# Patient Record
Sex: MALE | Race: WHITE | NOT HISPANIC OR LATINO | Employment: FULL TIME | URBAN - METROPOLITAN AREA
[De-identification: names, ages, dates, MRNs, and addresses within clinical notes are randomized per-mention and may not be internally consistent; named-entity substitution may affect disease eponyms.]

---

## 2022-10-15 ENCOUNTER — HOSPITAL ENCOUNTER (EMERGENCY)
Facility: CLINIC | Age: 52
Discharge: HOME | End: 2022-10-15
Attending: EMERGENCY MEDICINE | Admitting: EMERGENCY MEDICINE
Payer: COMMERCIAL

## 2022-10-15 VITALS
WEIGHT: 215 LBS | DIASTOLIC BLOOD PRESSURE: 107 MMHG | HEART RATE: 86 BPM | HEIGHT: 71 IN | RESPIRATION RATE: 20 BRPM | BODY MASS INDEX: 30.1 KG/M2 | OXYGEN SATURATION: 98 % | SYSTOLIC BLOOD PRESSURE: 151 MMHG | TEMPERATURE: 98.5 F

## 2022-10-15 DIAGNOSIS — F10.920 ALCOHOLIC INTOXICATION WITHOUT COMPLICATION (H): ICD-10-CM

## 2022-10-15 LAB
ALBUMIN SERPL-MCNC: 4.1 G/DL (ref 3.4–5)
ALP SERPL-CCNC: 82 U/L (ref 40–150)
ALT SERPL W P-5'-P-CCNC: 33 U/L (ref 0–70)
ANION GAP SERPL CALCULATED.3IONS-SCNC: 9 MMOL/L (ref 3–14)
AST SERPL W P-5'-P-CCNC: 23 U/L (ref 0–45)
ATRIAL RATE - MUSE: 103 BPM
BASOPHILS # BLD AUTO: 0 10E3/UL (ref 0–0.2)
BASOPHILS NFR BLD AUTO: 1 %
BILIRUB SERPL-MCNC: 0.3 MG/DL (ref 0.2–1.3)
BUN SERPL-MCNC: 13 MG/DL (ref 7–30)
CALCIUM SERPL-MCNC: 9.1 MG/DL (ref 8.5–10.1)
CHLORIDE BLD-SCNC: 109 MMOL/L (ref 94–109)
CO2 SERPL-SCNC: 24 MMOL/L (ref 20–32)
CREAT SERPL-MCNC: 0.88 MG/DL (ref 0.66–1.25)
DIASTOLIC BLOOD PRESSURE - MUSE: NORMAL MMHG
EOSINOPHIL # BLD AUTO: 0.1 10E3/UL (ref 0–0.7)
EOSINOPHIL NFR BLD AUTO: 1 %
ERYTHROCYTE [DISTWIDTH] IN BLOOD BY AUTOMATED COUNT: 13.3 % (ref 10–15)
ETHANOL SERPL-MCNC: 0.25 G/DL
GFR SERPL CREATININE-BSD FRML MDRD: >90 ML/MIN/1.73M2
GLUCOSE BLD-MCNC: 103 MG/DL (ref 70–99)
HCT VFR BLD AUTO: 50.5 % (ref 40–53)
HGB BLD-MCNC: 16.8 G/DL (ref 13.3–17.7)
IMM GRANULOCYTES # BLD: 0 10E3/UL
IMM GRANULOCYTES NFR BLD: 0 %
INTERPRETATION ECG - MUSE: NORMAL
LYMPHOCYTES # BLD AUTO: 3.5 10E3/UL (ref 0.8–5.3)
LYMPHOCYTES NFR BLD AUTO: 52 %
MCH RBC QN AUTO: 29.1 PG (ref 26.5–33)
MCHC RBC AUTO-ENTMCNC: 33.3 G/DL (ref 31.5–36.5)
MCV RBC AUTO: 88 FL (ref 78–100)
MONOCYTES # BLD AUTO: 0.4 10E3/UL (ref 0–1.3)
MONOCYTES NFR BLD AUTO: 6 %
NEUTROPHILS # BLD AUTO: 2.7 10E3/UL (ref 1.6–8.3)
NEUTROPHILS NFR BLD AUTO: 40 %
NRBC # BLD AUTO: 0 10E3/UL
NRBC BLD AUTO-RTO: 0 /100
P AXIS - MUSE: 47 DEGREES
PLATELET # BLD AUTO: 306 10E3/UL (ref 150–450)
POTASSIUM BLD-SCNC: 3.8 MMOL/L (ref 3.4–5.3)
PR INTERVAL - MUSE: 186 MS
PROT SERPL-MCNC: 7.8 G/DL (ref 6.8–8.8)
QRS DURATION - MUSE: 84 MS
QT - MUSE: 334 MS
QTC - MUSE: 437 MS
R AXIS - MUSE: 28 DEGREES
RBC # BLD AUTO: 5.77 10E6/UL (ref 4.4–5.9)
SODIUM SERPL-SCNC: 142 MMOL/L (ref 133–144)
SYSTOLIC BLOOD PRESSURE - MUSE: NORMAL MMHG
T AXIS - MUSE: 34 DEGREES
VENTRICULAR RATE- MUSE: 103 BPM
WBC # BLD AUTO: 6.7 10E3/UL (ref 4–11)

## 2022-10-15 PROCEDURE — 85014 HEMATOCRIT: CPT | Performed by: EMERGENCY MEDICINE

## 2022-10-15 PROCEDURE — 96365 THER/PROPH/DIAG IV INF INIT: CPT

## 2022-10-15 PROCEDURE — 80053 COMPREHEN METABOLIC PANEL: CPT | Performed by: EMERGENCY MEDICINE

## 2022-10-15 PROCEDURE — 250N000011 HC RX IP 250 OP 636: Performed by: EMERGENCY MEDICINE

## 2022-10-15 PROCEDURE — 93005 ELECTROCARDIOGRAM TRACING: CPT

## 2022-10-15 PROCEDURE — 36415 COLL VENOUS BLD VENIPUNCTURE: CPT | Performed by: EMERGENCY MEDICINE

## 2022-10-15 PROCEDURE — 82077 ASSAY SPEC XCP UR&BREATH IA: CPT | Performed by: EMERGENCY MEDICINE

## 2022-10-15 PROCEDURE — 258N000003 HC RX IP 258 OP 636: Performed by: EMERGENCY MEDICINE

## 2022-10-15 PROCEDURE — 99284 EMERGENCY DEPT VISIT MOD MDM: CPT | Mod: 25

## 2022-10-15 PROCEDURE — 250N000009 HC RX 250: Performed by: EMERGENCY MEDICINE

## 2022-10-15 PROCEDURE — 96361 HYDRATE IV INFUSION ADD-ON: CPT

## 2022-10-15 RX ADMIN — SODIUM CHLORIDE 1000 ML: 9 INJECTION, SOLUTION INTRAVENOUS at 15:55

## 2022-10-15 RX ADMIN — FOLIC ACID 1000 ML/HR: 5 INJECTION, SOLUTION INTRAMUSCULAR; INTRAVENOUS; SUBCUTANEOUS at 17:18

## 2022-10-15 ASSESSMENT — ACTIVITIES OF DAILY LIVING (ADL)
ADLS_ACUITY_SCORE: 35
ADLS_ACUITY_SCORE: 35

## 2022-10-15 NOTE — ED NOTES
Bed: ED19  Expected date:   Expected time:   Means of arrival:   Comments:  [facility] [place] - [facility] - 51 M ETOH eta [id number]

## 2022-10-15 NOTE — ED NOTES
Pt pulled IV  and  dressed self and tried to leave. Unsteady on feet. Security called when they left he started smoking in his room. Security called back. Second IV initiated and agreeable to bolus and to stay in bed. Security searched his pockets.

## 2022-10-15 NOTE — ED TRIAGE NOTES
Pt on a JULIAN. From home. ++ ETOH and benzo withdrawal. Was found by police doing a welfare check. Found snoring, lives alone. When they tried to walk him he fell. Hypertensive and tachy.

## 2022-10-15 NOTE — ED PROVIDER NOTES
History     Chief Complaint:  Withdrawal       HPI   Edison Burton is a 51 year old male with a history of alcoholism who presents to the emergency department with alcohol intoxication.  The patient states that he has been drinking for the last 10 years.  He states he was last in treatment 5 or 4 years ago and was sober for many years.  He states he normally drinks 2 drinks a day.  He states that he last drink about 130 this morning.  He does take Ativan and Wellbutrin for anxiety.  The report by medics is that his ex-wife was concerned about him so called police for a welfare check.  When police got there he was sleeping on a couch.  He was difficult to arouse and he tried to get him to walk but he was unable to walk on his own.  They then placed on a health officer hold and transported him to the emergency room.  His blood sugar was 105 on the scene.  There is no apparent injury.  The patient denies any recreational drug use.  Patient denies any fever, cough, sore throat.  He denies any thoughts of self-harm or depression.  He states he works during the day and has been has been able to do this without difficulty.  He declines any resources for cam Deppe.  He would like to go home when he is able.  He is declining detox..    ROS:  Review of Systems  As per the HPI the remaining systems are negative    Allergies:  No Known Allergies     Medications:    ADVIL 200 MG OR TABS  ROBITUSSIN A-C  MG/5ML OR SYRP  TAMIFLU 75 MG OR CAPS    ativan  Wellbutrin    Past Medical History:    Alcohol use  Anxiety    Past Surgical History:    No past surgical history on file.     Family History:    family history is not on file.    Social History:   reports that he has been smoking cigarettes. He has a 10.00 pack-year smoking history. He does not have any smokeless tobacco history on file.  PCP: No primary care provider on file.     Physical Exam     Patient Vitals for the past 24 hrs:   BP Temp Temp src Pulse Resp SpO2  "Height Weight   10/15/22 1640 -- -- -- -- -- 98 % -- --   10/15/22 1630 (!) 150/117 -- -- -- -- -- -- --   10/15/22 1600 -- -- -- 101 20 -- -- --   10/15/22 1550 (!) 161/103 -- -- -- -- 97 % -- --   10/15/22 1547 (!) 161/103 -- -- 113 18 97 % 1.803 m (5' 11\") 97.5 kg (215 lb)   10/15/22 1532 (!) 145/100 98.5  F (36.9  C) Temporal 70 16 100 % 1.803 m (5' 11\") 97.5 kg (215 lb)        Physical Exam    Physical Exam   Constitutional:  Patient is oriented to person, place, and time. They appear well-developed and well-nourished. Mild distress secondary to intoxication.  Appears to be grossly intoxicated but was found to be wandering around the emergency room.  HENT:   Mouth/Throat:   Oropharynx is clear and moist.   Eyes:    Conjunctivae normal and EOM are normal. Pupils are equal, round, and reactive to light.   Neck:    Normal range of motion.   Cardiovascular: Mildly tachycardic regular rhythm and normal heart sounds.  Exam reveals no gallop and no friction rub.  No murmur heard.  Pulmonary/Chest:  Effort normal and breath sounds normal. Patient has no wheezes. Patient has no rales.   Abdominal:   Soft. Bowel sounds are normal. Patient exhibits no mass. There is no tenderness. There is no rebound and no guarding.   Musculoskeletal:  Normal range of motion. Patient exhibits no edema.   Neurological:   Patient is alert and oriented to person, place, and time. Patient has normal strength. No cranial nerve deficit or sensory deficit. GCS 15  Skin:   Skin is warm and dry. No rash noted. No erythema. No signs of injury  Psychiatric:   Patient has a normal mood .  Patient denies feeling suicidal      Emergency Department Course   ECG:  ECG results from 10/15/22   EKG 12-lead, tracing only     Value    Systolic Blood Pressure     Diastolic Blood Pressure     Ventricular Rate 103    Atrial Rate 103    PA Interval 186    QRS Duration 84        QTc 437    P Axis 47    R AXIS 28    T Axis 34    Interpretation ECG      Sinus " tachycardia  Otherwise normal ECG  No previous ECGs available  Confirmed by GENERATED REPORT, COMPUTER ([id number]),  Nahid Leblanc ([id number]) on 10/15/2022 4:06:57 PM         Laboratory:  Labs Ordered and Resulted from Time of ED Arrival to Time of ED Departure   COMPREHENSIVE METABOLIC PANEL - Abnormal       Result Value    Sodium 142      Potassium 3.8      Chloride 109      Carbon Dioxide (CO2) 24      Anion Gap 9      Urea Nitrogen 13      Creatinine 0.88      Calcium 9.1      Glucose 103 (*)     Alkaline Phosphatase 82      AST 23      ALT 33      Protein Total 7.8      Albumin 4.1      Bilirubin Total 0.3      GFR Estimate >90     ETHYL ALCOHOL LEVEL - Abnormal    Alcohol ethyl 0.25 (*)    CBC WITH PLATELETS AND DIFFERENTIAL    WBC Count 6.7      RBC Count 5.77      Hemoglobin 16.8      Hematocrit 50.5      MCV 88      MCH 29.1      MCHC 33.3      RDW 13.3      Platelet Count 306      % Neutrophils 40      % Lymphocytes 52      % Monocytes 6      % Eosinophils 1      % Basophils 1      % Immature Granulocytes 0      NRBCs per 100 WBC 0      Absolute Neutrophils 2.7      Absolute Lymphocytes 3.5      Absolute Monocytes 0.4      Absolute Eosinophils 0.1      Absolute Basophils 0.0      Absolute Immature Granulocytes 0.0      Absolute NRBCs 0.0       Emergency Department Course:             Reviewed:  I reviewed nursing notes, vitals and past medical history    Assessments:  1755 I obtained history and examined the patient as noted above.   1620 I was called to the patient's room the patient pulled his IV out and started smoking in his room.  I explained to him that I would like to give him IV fluids to help him sober up and get him home quicker.  The patient stated he did not want to be here.  He is still clinically too intoxicated to go home.  The patient did agree to keep an IV in.   I rechecked the patient and explained findings.   1745 patient was try to get his belongings to go home.  I had him get back into  bed.  His banana bag is alf in.  I explained to him that his alcohol level is high and he is still clinically too intoxicated to go home.  The patient is upset and angry that he cannot go home.  Again I explained to him that he will be able to go home when he is clinically sober.      Interventions:  Medications   0.9% sodium chloride BOLUS (1,000 mLs Intravenous New Bag 10/15/22 0585)   dextrose 5% and 0.9% NaCl 1,000 mL with Infuvite Adult 10 mL, thiamine 100 mg, folic acid 1 mg infusion (1,000 mL/hr Intravenous New Bag 10/15/22 8979)        Disposition:  The patient will board in the emergency department until clinically sober.  Care was signed out to Dr. Ortiz.     Impression & Plan    CMS Diagnoses: None      Medical Decision Making:  Edison mid-June is a 51-year-old gentleman presenting to the emergency department on a health officer hold for acute alcohol intoxication.  They did a welfare check on him and found him laying on the couch and unable to care for himself.  There is no signs of injury on him and there was no report of any trauma.  Blood work was performed.  His alcohol is elevated at 0.25.  He denies any other substances but certainly could have something else on board and he does take benzos and Wellbutrin for his anxiety.  Metabolic panel and CBC are unremarkable.  He is very intoxicated at this time so we will have him sober up in the emergency room.  He declines any detox or resources.  He tells me he drank 2 drinks today.  It looks like it was a bit more than that.  I do not see that he is in in the emergency department for this at all.  He is receiving IV fluids.  Again he is on an JULIAN until he is medically sober to go home.  Will sign out to my oncoming partner.      Diagnosis:    ICD-10-CM    1. Alcoholic intoxication without complication (H)  F10.920            Discharge Medications:  New Prescriptions    No medications on file        10/15/2022   Mariana Hopper MD         Mariana Hopper MD  10/15/22 [id number]

## 2022-10-16 NOTE — ED PROVIDER NOTES
Patient signed out to me pending sobriety to be able to be discharged.  He is now awake alert ambulating without any assistance.  He wants to go home.  I have asked him to call for a ride and he will have someone come and get him.  I feel he is safe for discharge.  I encouraged him to drink in moderation in the future.      ICD-10-CM    1. Alcoholic intoxication without complication (H)  F10.920              Nancy Ferrara MD  10/15/22 1927

## 2022-10-16 NOTE — ED NOTES
Pt tried to elope, was brought back to rm 19. Security walked pt to  to be discharged by his friend who was coming to pick him up.